# Patient Record
Sex: MALE | Race: WHITE | URBAN - METROPOLITAN AREA
[De-identification: names, ages, dates, MRNs, and addresses within clinical notes are randomized per-mention and may not be internally consistent; named-entity substitution may affect disease eponyms.]

---

## 2021-01-11 ENCOUNTER — NURSE TRIAGE (OUTPATIENT)
Dept: OTHER | Facility: OTHER | Age: 31
End: 2021-01-11

## 2021-01-11 NOTE — TELEPHONE ENCOUNTER
1st attempt   Attempted to call pt back to back  Each time call went straight to VM  Left VM to please call [phone], option 7, if there are still concerns  Reason for Disposition   Message left on unidentified voice mail  Phone number verified      Protocols used: NO CONTACT OR DUPLICATE CONTACT CALL-ADULT-[place]

## 2021-01-11 NOTE — TELEPHONE ENCOUNTER
Regarding: Avelino Smallwood- Fatigue  ----- Message from Torito Chawla sent at 1/11/2021  3:13 PM EST -----  "I came into contact with someone who tested positive   I am feeling fatigue "

## 2021-01-12 ENCOUNTER — NURSE TRIAGE (OUTPATIENT)
Dept: OTHER | Facility: OTHER | Age: 31
End: 2021-01-12

## 2021-01-12 DIAGNOSIS — Z20.828 EXPOSURE TO SARS-ASSOCIATED CORONAVIRUS: Primary | ICD-10-CM

## 2021-01-12 DIAGNOSIS — Z20.828 EXPOSURE TO SARS-ASSOCIATED CORONAVIRUS: ICD-10-CM

## 2021-01-12 PROCEDURE — U0003 INFECTIOUS AGENT DETECTION BY NUCLEIC ACID (DNA OR RNA); SEVERE ACUTE RESPIRATORY SYNDROME CORONAVIRUS 2 (SARS-COV-2) (CORONAVIRUS DISEASE [COVID-19]), AMPLIFIED PROBE TECHNIQUE, MAKING USE OF HIGH THROUGHPUT TECHNOLOGIES AS DESCRIBED BY CMS-2020-01-R: HCPCS | Performed by: FAMILY MEDICINE

## 2021-01-12 PROCEDURE — U0005 INFEC AGEN DETEC AMPLI PROBE: HCPCS | Performed by: FAMILY MEDICINE

## 2021-01-12 NOTE — TELEPHONE ENCOUNTER
1  Were you within 6 feet or less, for up to 15 minutes or more with a person that has a confirmed COVID-19 test? Yes, family member  2  What was the date of your exposure? 01/04/2021  3  Are you experiencing any symptoms attributed to the virus?  (Assess for SOB, cough, fever, difficulty breathing) Fatigue and nasal congestion  4  HIGH RISK: Do you have any history heart or lung conditions, weakened immune system, diabetes, Asthma, CHF, HIV, COPD, Chemo, renal failure, sickle cell, etc? Healthy  5  PREGNANCY: Are you pregnant or did you recently give birth?  N/A

## 2021-01-12 NOTE — TELEPHONE ENCOUNTER
Regarding: COVID-19/ SYMPTOMATIC- FATIGUE   ----- Message from Vanessa Florentino sent at 1/12/2021 11:51 AM EST -----     "I came into contact with someone who tested positive   I am feeling fatigue "

## 2021-01-12 NOTE — TELEPHONE ENCOUNTER
Reason for Disposition   [1] COVID-19 infection suspected by caller or triager AND [2] mild symptoms (cough, fever, or others) AND [1] no complications or SOB    Protocols used: CORONAVIRUS (COVID-19) DIAGNOSED OR [facility]USPECTED[facility]

## 2021-01-13 LAB — SARS-COV-2 RNA SPEC QL NAA+PROBE: NOT DETECTED

## 2021-09-17 ENCOUNTER — NURSE TRIAGE (OUTPATIENT)
Dept: OTHER | Facility: OTHER | Age: 31
End: 2021-09-17

## 2021-09-17 DIAGNOSIS — Z20.828 SARS-ASSOCIATED CORONAVIRUS EXPOSURE: Primary | ICD-10-CM

## 2021-09-17 NOTE — TELEPHONE ENCOUNTER
Regarding: Covid-Symptomatic  ----- Message from Radha Chaudhary sent at 9/17/2021  5:00 PM EDT -----  " I need to get tested due to I am having Symptoms of Body Aches, sneezing, Cough, Fever, Headache" Parent

## 2021-09-17 NOTE — TELEPHONE ENCOUNTER
Reason for Disposition   [1] COVID-19 infection suspected by caller or triager AND [2] mild symptoms (cough, fever, or others) AND [8] no complications or SOB    Answer Assessment - Initial Assessment Questions  1  COVID-19 DIAGNOSIS: "Who made your Coronavirus (COVID-19) diagnosis?" "Was it confirmed by a positive lab test?" If not diagnosed by a HCP, ask "Are there lots of cases (community spread) where you live?" (See public health department website, if unsure)      N/a   2  COVID-19 EXPOSURE: "Was there any known exposure to David before the symptoms began?" [facility] Definition of close contact: within 6 feet (2 meters) for a total of 15 minutes or more over a 24-hour period  No   3  ONSET: "When did the COVID-19 symptoms start?"       Earlier today  4  WORST SYMPTOM: "What is your worst symptom?" (e g , cough, fever, shortness of breath, muscle aches)      Cough  5  COUGH: "Do you have a cough?" If Yes, ask: "How bad is the cough?"        Moderate  6  FEVER: "Do you have a fever?" If Yes, ask: "What is your temperature, how was it measured, and when did it start?"      Unsure  7  RESPIRATORY STATUS: "Describe your breathing?" (e g , shortness of breath, wheezing, unable to speak)       Normal  8  BETTER-SAME-WORSE: "Are you getting better, staying the same or getting worse compared to yesterday?"  If getting worse, ask, "In what way?"      Worse  9  HIGH RISK DISEASE: "Do you have any chronic medical problems?" (e g , asthma, heart or lung disease, weak immune system, obesity, etc )      No  10  PREGNANCY: "Is there any chance you are pregnant?" "When was your last menstrual period?"        No  11   OTHER SYMPTOMS: "Do you have any other symptoms?"  (e g , chills, fatigue, headache, loss of smell or taste, muscle pain, sore throat; new loss of smell or taste especially support the diagnosis of COVID-19)        Headache    Protocols used: CORONAVIRUS (COVID-19) DIAGNOSED OR SUSPECTED[facility]

## 2021-09-18 PROCEDURE — U0005 INFEC AGEN DETEC AMPLI PROBE: HCPCS | Performed by: FAMILY MEDICINE

## 2021-09-18 PROCEDURE — U0003 INFECTIOUS AGENT DETECTION BY NUCLEIC ACID (DNA OR RNA); SEVERE ACUTE RESPIRATORY SYNDROME CORONAVIRUS 2 (SARS-COV-2) (CORONAVIRUS DISEASE [COVID-19]), AMPLIFIED PROBE TECHNIQUE, MAKING USE OF HIGH THROUGHPUT TECHNOLOGIES AS DESCRIBED BY CMS-2020-01-R: HCPCS | Performed by: FAMILY MEDICINE

## 2021-09-21 ENCOUNTER — TELEPHONE (OUTPATIENT)
Dept: OTHER | Facility: OTHER | Age: 31
End: 2021-09-21

## 2021-09-21 NOTE — TELEPHONE ENCOUNTER
First attempt  The patient was called for notification of a test result for COVID-19  The patient did not answer the phone and a voicemail was left requesting a call back to [phone], Option 7

## 2021-09-22 NOTE — TELEPHONE ENCOUNTER
The patient was called for notification of a test result for COVID-19  The patient did not answer the phone and a voicemail was left requesting a call back to [phone], Option 7

## 2021-09-24 ENCOUNTER — TELEPHONE (OUTPATIENT)
Dept: URGENT CARE | Facility: CLINIC | Age: 31
End: 2021-09-24